# Patient Record
Sex: MALE | URBAN - METROPOLITAN AREA
[De-identification: names, ages, dates, MRNs, and addresses within clinical notes are randomized per-mention and may not be internally consistent; named-entity substitution may affect disease eponyms.]

---

## 2018-12-12 ENCOUNTER — APPOINTMENT (RX ONLY)
Dept: URBAN - METROPOLITAN AREA CLINIC 166 | Facility: CLINIC | Age: 68
Setting detail: DERMATOLOGY
End: 2018-12-12

## 2018-12-12 DIAGNOSIS — B36.0 PITYRIASIS VERSICOLOR: ICD-10-CM

## 2018-12-12 DIAGNOSIS — L50.1 IDIOPATHIC URTICARIA: ICD-10-CM

## 2018-12-12 PROBLEM — L29.8 OTHER PRURITUS: Status: ACTIVE | Noted: 2018-12-12

## 2018-12-12 PROBLEM — I10 ESSENTIAL (PRIMARY) HYPERTENSION: Status: ACTIVE | Noted: 2018-12-12

## 2018-12-12 PROBLEM — J30.1 ALLERGIC RHINITIS DUE TO POLLEN: Status: ACTIVE | Noted: 2018-12-12

## 2018-12-12 PROBLEM — E78.5 HYPERLIPIDEMIA, UNSPECIFIED: Status: ACTIVE | Noted: 2018-12-12

## 2018-12-12 PROCEDURE — ? PRESCRIPTION

## 2018-12-12 PROCEDURE — ? TREATMENT REGIMEN

## 2018-12-12 PROCEDURE — ? ADDITIONAL NOTES

## 2018-12-12 PROCEDURE — 99203 OFFICE O/P NEW LOW 30 MIN: CPT

## 2018-12-12 PROCEDURE — ? COUNSELING

## 2018-12-12 RX ORDER — TRIAMCINOLONE ACETONIDE 1 MG/G
CREAM TOPICAL
Qty: 1 | Refills: 2 | Status: ERX | COMMUNITY
Start: 2018-12-12

## 2018-12-12 RX ORDER — HYDROXYZINE HYDROCHLORIDE 25 MG/1
TABLET, FILM COATED ORAL
Qty: 60 | Refills: 1 | Status: ERX | COMMUNITY
Start: 2018-12-12

## 2018-12-12 RX ORDER — KETOCONAZOLE 20 MG/G
CREAM TOPICAL
Qty: 1 | Refills: 2 | Status: ERX | COMMUNITY
Start: 2018-12-12

## 2018-12-12 RX ORDER — PREDNISONE 20 MG/1
TABLET ORAL
Qty: 30 | Refills: 1 | Status: ERX | COMMUNITY
Start: 2018-12-12

## 2018-12-12 RX ADMIN — PREDNISONE: 20 TABLET ORAL at 00:00

## 2018-12-12 RX ADMIN — HYDROXYZINE HYDROCHLORIDE: 25 TABLET, FILM COATED ORAL at 00:00

## 2018-12-12 RX ADMIN — TRIAMCINOLONE ACETONIDE: 1 CREAM TOPICAL at 00:00

## 2018-12-12 RX ADMIN — KETOCONAZOLE: 20 CREAM TOPICAL at 00:00

## 2018-12-12 ASSESSMENT — LOCATION DETAILED DESCRIPTION DERM
LOCATION DETAILED: SUPERIOR THORACIC SPINE
LOCATION DETAILED: LEFT RIB CAGE

## 2018-12-12 ASSESSMENT — LOCATION ZONE DERM: LOCATION ZONE: TRUNK

## 2018-12-12 ASSESSMENT — LOCATION SIMPLE DESCRIPTION DERM
LOCATION SIMPLE: ABDOMEN
LOCATION SIMPLE: UPPER BACK

## 2018-12-12 NOTE — PROCEDURE: ADDITIONAL NOTES
Detail Level: Simple
Additional Notes: Avoid long showers, baths only until condition resolves. Stay moisturized. Light clothing only.

## 2019-04-02 ENCOUNTER — APPOINTMENT (RX ONLY)
Dept: URBAN - METROPOLITAN AREA CLINIC 95 | Facility: CLINIC | Age: 69
Setting detail: DERMATOLOGY
End: 2019-04-02

## 2019-04-02 DIAGNOSIS — L81.4 OTHER MELANIN HYPERPIGMENTATION: ICD-10-CM

## 2019-04-02 DIAGNOSIS — L82.1 OTHER SEBORRHEIC KERATOSIS: ICD-10-CM

## 2019-04-02 DIAGNOSIS — D22 MELANOCYTIC NEVI: ICD-10-CM

## 2019-04-02 DIAGNOSIS — D18.0 HEMANGIOMA: ICD-10-CM

## 2019-04-02 DIAGNOSIS — L50.3 DERMATOGRAPHIC URTICARIA: ICD-10-CM

## 2019-04-02 PROBLEM — D18.01 HEMANGIOMA OF SKIN AND SUBCUTANEOUS TISSUE: Status: ACTIVE | Noted: 2019-04-02

## 2019-04-02 PROBLEM — D22.5 MELANOCYTIC NEVI OF TRUNK: Status: ACTIVE | Noted: 2019-04-02

## 2019-04-02 PROCEDURE — ? COUNSELING

## 2019-04-02 PROCEDURE — 99213 OFFICE O/P EST LOW 20 MIN: CPT

## 2019-04-02 PROCEDURE — ? PRESCRIPTION

## 2019-04-02 RX ORDER — RANITIDINE 150 MG/1
TABLET ORAL
Qty: 30 | Refills: 0 | Status: ERX | COMMUNITY
Start: 2019-04-02

## 2019-04-02 RX ORDER — LORATADINE 10 MG/1
CAPSULE, LIQUID FILLED ORAL
Qty: 30 | Refills: 2 | Status: ERX | COMMUNITY
Start: 2019-04-02

## 2019-04-02 RX ORDER — MONTELUKAST SODIUM 10 MG/1
TABLET, FILM COATED ORAL
Qty: 30 | Refills: 2 | Status: ERX | COMMUNITY
Start: 2019-04-02

## 2019-04-02 RX ADMIN — LORATADINE: 10 CAPSULE, LIQUID FILLED ORAL at 19:35

## 2019-04-02 RX ADMIN — RANITIDINE: 150 TABLET ORAL at 19:35

## 2019-04-02 RX ADMIN — MONTELUKAST SODIUM: 10 TABLET, FILM COATED ORAL at 19:35

## 2019-04-02 ASSESSMENT — LOCATION SIMPLE DESCRIPTION DERM
LOCATION SIMPLE: LEFT UPPER ARM
LOCATION SIMPLE: LEFT ANTERIOR NECK
LOCATION SIMPLE: RIGHT UPPER ARM
LOCATION SIMPLE: UPPER BACK

## 2019-04-02 ASSESSMENT — LOCATION ZONE DERM
LOCATION ZONE: TRUNK
LOCATION ZONE: ARM
LOCATION ZONE: NECK

## 2019-04-02 ASSESSMENT — LOCATION DETAILED DESCRIPTION DERM
LOCATION DETAILED: LEFT CLAVICULAR NECK
LOCATION DETAILED: LEFT DISTAL POSTERIOR UPPER ARM
LOCATION DETAILED: RIGHT PROXIMAL POSTERIOR UPPER ARM
LOCATION DETAILED: INFERIOR THORACIC SPINE

## 2019-04-30 ENCOUNTER — APPOINTMENT (RX ONLY)
Dept: URBAN - METROPOLITAN AREA CLINIC 166 | Facility: CLINIC | Age: 69
Setting detail: DERMATOLOGY
End: 2019-04-30

## 2019-04-30 DIAGNOSIS — L50.8 OTHER URTICARIA: ICD-10-CM

## 2019-04-30 PROBLEM — L50.1 IDIOPATHIC URTICARIA: Status: ACTIVE | Noted: 2019-04-30

## 2019-04-30 PROCEDURE — ? COUNSELING

## 2019-04-30 PROCEDURE — ? PRESCRIPTION

## 2019-04-30 PROCEDURE — 99214 OFFICE O/P EST MOD 30 MIN: CPT

## 2019-04-30 PROCEDURE — ? RECOMMENDATIONS

## 2019-04-30 RX ORDER — TRIAMCINOLONE ACETONIDE 1 MG/G
CREAM TOPICAL
Qty: 1 | Refills: 2 | Status: ERX

## 2019-04-30 RX ORDER — HYDROXYZINE HYDROCHLORIDE 25 MG/1
25MG TABLET, FILM COATED ORAL
Qty: 60 | Refills: 2 | Status: CANCELLED

## 2019-04-30 NOTE — PROCEDURE: RECOMMENDATIONS
Detail Level: Zone
Recommendation Preamble: The following recommendations were made during the visit:blue cleanser bid

## 2019-05-07 ENCOUNTER — RX ONLY (OUTPATIENT)
Age: 69
Setting detail: RX ONLY
End: 2019-05-07

## 2019-05-07 RX ORDER — HYDROXYZINE HYDROCHLORIDE 25 MG/1
TABLET, FILM COATED ORAL
Qty: 60 | Refills: 1 | Status: ERX